# Patient Record
Sex: FEMALE | Race: OTHER | HISPANIC OR LATINO | Employment: OTHER | URBAN - METROPOLITAN AREA
[De-identification: names, ages, dates, MRNs, and addresses within clinical notes are randomized per-mention and may not be internally consistent; named-entity substitution may affect disease eponyms.]

---

## 2024-02-28 ENCOUNTER — OFFICE VISIT (OUTPATIENT)
Dept: FAMILY MEDICINE CLINIC | Facility: CLINIC | Age: 44
End: 2024-02-28

## 2024-02-28 VITALS
WEIGHT: 178.6 LBS | SYSTOLIC BLOOD PRESSURE: 118 MMHG | OXYGEN SATURATION: 99 % | DIASTOLIC BLOOD PRESSURE: 72 MMHG | RESPIRATION RATE: 18 BRPM | BODY MASS INDEX: 35.06 KG/M2 | HEIGHT: 60 IN | HEART RATE: 74 BPM | TEMPERATURE: 98.2 F

## 2024-02-28 DIAGNOSIS — Z59.41 FOOD INSECURITY: Primary | ICD-10-CM

## 2024-02-28 DIAGNOSIS — D50.9 IRON DEFICIENCY ANEMIA, UNSPECIFIED IRON DEFICIENCY ANEMIA TYPE: ICD-10-CM

## 2024-02-28 DIAGNOSIS — Z13.9 ENCOUNTER FOR HEALTH-RELATED SCREENING: ICD-10-CM

## 2024-02-28 PROCEDURE — 99203 OFFICE O/P NEW LOW 30 MIN: CPT | Performed by: FAMILY MEDICINE

## 2024-02-28 SDOH — ECONOMIC STABILITY - FOOD INSECURITY: FOOD INSECURITY: Z59.41

## 2024-03-03 PROBLEM — D50.9 IRON DEFICIENCY ANEMIA: Status: ACTIVE | Noted: 2024-03-03

## 2024-03-04 NOTE — PROGRESS NOTES
Assessment/Plan:    1. Food insecurity  Comments:  Patient currently undocumented.  Will benefit from our social work referral.    2. Iron deficiency anemia, unspecified iron deficiency anemia type  Assessment & Plan:  Instructed patient to continue iron tablets with vitamin C every other day.  Will reorder CBC.  Once patient has health insurance, we will encourage patient to do a colonoscopy.    Orders:  -     Iron Carbonyl-Vitamin C-FOS 30-10-25 MG CHEW; 1 tablet every other day  -     CBC (Includes Diff/Plt) (Refl); Future    3. Encounter for health-related screening  Comments:  Patient continues to require Pap smear.  Will hold off on mammogram and colonoscopy until patient receives insurance.         Subjective:      Patient ID: Amy Rosen is a 43 y.o. female.    Patient coming in after recent ED visit where she was found to have lab values significant for iron deficiency anemia.  Patient continues to endorse having periods, however she states that her periods are normal in duration amount.  Patient was prescribed iron tablets however states that she stopped taking it because she starts feeling better.  Patient continues to endorse weakness as well as lightheadedness.        The following portions of the patient's history were reviewed and updated as appropriate: allergies, current medications, past family history, past medical history, past social history, past surgical history, and problem list.    Review of Systems   Constitutional:  Positive for fatigue. Negative for chills and fever.   HENT:  Negative for ear pain and sore throat.    Eyes:  Negative for pain and visual disturbance.   Respiratory:  Negative for cough and shortness of breath.    Cardiovascular:  Negative for chest pain and palpitations.   Gastrointestinal:  Negative for abdominal pain and vomiting.   Genitourinary:  Negative for dysuria and hematuria.   Musculoskeletal:  Negative for arthralgias and back pain.   Skin:  Negative for color  change and rash.   Neurological:  Positive for light-headedness. Negative for seizures and syncope.   All other systems reviewed and are negative.        Objective:      /72 (BP Location: Right arm, Patient Position: Sitting, Cuff Size: Standard)   Pulse 74   Temp 98.2 °F (36.8 °C) (Temporal)   Resp 18   Ht 5' (1.524 m)   Wt 81 kg (178 lb 9.6 oz)   SpO2 99%   BMI 34.88 kg/m²          Physical Exam  Constitutional:       General: She is not in acute distress.     Appearance: Normal appearance.   HENT:      Nose: No congestion or rhinorrhea.   Eyes:      Extraocular Movements: Extraocular movements intact.      Pupils: Pupils are equal, round, and reactive to light.   Cardiovascular:      Rate and Rhythm: Normal rate and regular rhythm.      Pulses: Normal pulses.      Heart sounds: Normal heart sounds. No murmur heard.     No gallop.   Pulmonary:      Effort: Pulmonary effort is normal.      Breath sounds: Normal breath sounds. No wheezing, rhonchi or rales.   Abdominal:      General: Bowel sounds are normal. There is no distension.      Palpations: Abdomen is soft. There is no mass.      Tenderness: There is no abdominal tenderness.      Hernia: No hernia is present.   Skin:     General: Skin is warm.      Coloration: Skin is not jaundiced.      Findings: No bruising.   Neurological:      General: No focal deficit present.      Mental Status: She is alert and oriented to person, place, and time.   Psychiatric:         Mood and Affect: Mood normal.         Behavior: Behavior normal.         Thought Content: Thought content normal.           Wilbert Trejo DO   Family Medicine PGY-3  3/3/2024

## 2024-03-04 NOTE — ASSESSMENT & PLAN NOTE
Instructed patient to continue iron tablets with vitamin C every other day.  Will reorder CBC.  Once patient has health insurance, we will encourage patient to do a colonoscopy.